# Patient Record
Sex: FEMALE | Race: WHITE | NOT HISPANIC OR LATINO | Employment: FULL TIME | URBAN - METROPOLITAN AREA
[De-identification: names, ages, dates, MRNs, and addresses within clinical notes are randomized per-mention and may not be internally consistent; named-entity substitution may affect disease eponyms.]

---

## 2023-07-25 ENCOUNTER — TELEPHONE (OUTPATIENT)
Dept: FAMILY MEDICINE CLINIC | Facility: CLINIC | Age: 33
End: 2023-07-25
Payer: COMMERCIAL

## 2023-07-25 NOTE — TELEPHONE ENCOUNTER
Caller: Yvrose Suarez    Relationship to patient: Self    Best call back number: [phone]     Patient is needing: PATIENT STATES SHE WAS GIVEN A REFERRAL TO DERMATOLOGY BUT THE OFFICE IT WAS SENT TO DID NOT RECEIVE IT YET. SHE STATES THE OFFICE WAS MOVING LOCATIONS AND THEIR ELECTRONIC SERVICES GOT A LITTLE MESSED UP. SHE IS REQUESTING TO HAVE HER REFERRAL RESENT TO THE LOCATION SO SHE CAN COMPLETE THE VISIT SHE SCHEDULED.     PATIENT STATES THE OFFICE IS CALLED [facility] - PHONE - [phone] (PATIENT COULD NOT LOCATE A FAX NUMBER).

## 2023-07-28 NOTE — TELEPHONE ENCOUNTER
Called patient and left voicemail letting her know her referral was sent to [facility].

## 2023-11-13 ENCOUNTER — OFFICE VISIT (OUTPATIENT)
Age: 33
End: 2023-11-13
Payer: COMMERCIAL

## 2023-11-13 ENCOUNTER — LAB (OUTPATIENT)
Age: 33
End: 2023-11-13
Payer: COMMERCIAL

## 2023-11-13 VITALS
WEIGHT: 147 LBS | HEART RATE: 77 BPM | SYSTOLIC BLOOD PRESSURE: 112 MMHG | DIASTOLIC BLOOD PRESSURE: 64 MMHG | TEMPERATURE: 98.2 F | OXYGEN SATURATION: 100 % | BODY MASS INDEX: 26.05 KG/M2 | HEIGHT: 63 IN

## 2023-11-13 DIAGNOSIS — Z00.00 ROUTINE GENERAL MEDICAL EXAMINATION AT A HEALTH CARE FACILITY: Primary | ICD-10-CM

## 2023-11-13 DIAGNOSIS — Z83.3 FAMILY HISTORY OF DIABETES MELLITUS (DM): ICD-10-CM

## 2023-11-13 DIAGNOSIS — Z83.3 FAMILY HISTORY OF DIABETES MELLITUS: ICD-10-CM

## 2023-11-13 DIAGNOSIS — Z00.00 ANNUAL PHYSICAL EXAM: Primary | ICD-10-CM

## 2023-11-13 DIAGNOSIS — Z00.00 ANNUAL PHYSICAL EXAM: ICD-10-CM

## 2023-11-13 PROCEDURE — 85027 COMPLETE CBC AUTOMATED: CPT | Performed by: NURSE PRACTITIONER

## 2023-11-13 PROCEDURE — 80053 COMPREHEN METABOLIC PANEL: CPT | Performed by: NURSE PRACTITIONER

## 2023-11-13 PROCEDURE — 83036 HEMOGLOBIN GLYCOSYLATED A1C: CPT | Performed by: NURSE PRACTITIONER

## 2023-11-13 PROCEDURE — 80061 LIPID PANEL: CPT | Performed by: NURSE PRACTITIONER

## 2023-11-13 RX ORDER — COPPER 313.4 MG/1
INTRAUTERINE DEVICE INTRAUTERINE
COMMUNITY
Start: 2023-08-03

## 2023-11-13 NOTE — PROGRESS NOTES
"Chief Complaint  Annual Exam    Subjective          Yvrose Suarez presents to [facility] for   History of Present Illness  Pt is here today for annual physical. She has no concerns at this time. She stopped breastfeeding recently. Last PAP was in the last couple of months when ParaGard IUD was placed. She delivered a baby in August.     Objective   Vital Signs:   Vitals:    11/13/23 1424   BP: 112/64   Pulse: 77   Temp: 98.2 °F (36.8 °C)   SpO2: 100%   Weight: 66.7 kg (147 lb)   Height: 160 cm (63\")     Body mass index is 26.04 kg/m².    BMI is >= 25 and <30. (Overweight) The following options were offered after discussion;: Recently post-partum.    Physical Exam  Vitals reviewed.   Constitutional:       Appearance: Normal appearance.   HENT:      Right Ear: Tympanic membrane, ear canal and external ear normal.      Left Ear: Tympanic membrane, ear canal and external ear normal.      Nose: Nose normal.      Mouth/Throat:      Mouth: Mucous membranes are moist.      Pharynx: Oropharynx is clear.   Eyes:      Extraocular Movements: Extraocular movements intact.      Conjunctiva/sclera: Conjunctivae normal.      Pupils: Pupils are equal, round, and reactive to light.   Cardiovascular:      Rate and Rhythm: Normal rate and regular rhythm.      Heart sounds: Normal heart sounds.   Pulmonary:      Effort: Pulmonary effort is normal.      Breath sounds: Normal breath sounds.   Abdominal:      General: Bowel sounds are normal.      Palpations: Abdomen is soft.      Tenderness: There is no abdominal tenderness. There is no guarding or rebound.   Musculoskeletal:         General: Normal range of motion.      Cervical back: Normal range of motion and neck supple.   Skin:     General: Skin is warm.   Neurological:      Mental Status: She is alert and oriented to person, place, and time.   Psychiatric:         Mood and Affect: Mood normal.         Behavior: Behavior normal.         Thought Content: " Thought content normal.        Result Review :                Immunization History   Administered Date(s) Administered    31-influenza Vac Quardvalent Preservativ 11/13/2017, 09/24/2018    DTP 04/10/1996    H1N1 All Forms 11/06/2009    HPV Quadrivalent 09/26/2007, 11/27/2007, 04/16/2008    Hep B, Adolescent or Pediatric 02/07/2002, 04/11/2002    Hep B, Unspecified 04/10/1996    MMR 04/10/1996    OPV 04/10/1996    Tdap 05/15/2007, 09/24/2018, 05/11/2023       Health Maintenance   Topic Date Due    BMI FOLLOWUP  Never done    COVID-19 Vaccine (1) Never done    Pneumococcal Vaccine 0-64 (1 - PCV) Never done    HEPATITIS C SCREENING  Never done    ANNUAL PHYSICAL  Never done    PAP SMEAR  Never done    INFLUENZA VACCINE  03/31/2024 (Originally 8/1/2023)    TDAP/TD VACCINES (4 - Td or Tdap) 05/11/2033        Assessment and Plan    Diagnoses and all orders for this visit:    1. Annual physical exam (Primary)  -     CBC (No Diff); Future  -     Comprehensive Metabolic Panel; Future  -     Lipid Panel; Future    2. Family history of diabetes mellitus (DM)  -     Hemoglobin A1c; Future      Counseling/anticipatory guidance: Nutrition, physical activity, healthy weight, dental health, mental health, eye exam, immunizations, screenings    Discussed vaccines; defers flu vaccine at this time.  Discussed women's health exam; up to date.  Advised pt on BMI; recent post-partum approaching normal BMI range.      Follow Up   Return in about 1 year (around 11/13/2024) for Annual.  Patient was given instructions and counseling regarding her condition or for health maintenance advice. Please see specific information pulled into the AVS if appropriate.

## 2023-11-14 LAB
ALBUMIN SERPL-MCNC: 4.4 G/DL (ref 3.5–5.2)
ALBUMIN/GLOB SERPL: 1.6 G/DL
ALP SERPL-CCNC: 96 U/L (ref 39–117)
ALT SERPL W P-5'-P-CCNC: 10 U/L (ref 1–33)
ANION GAP SERPL CALCULATED.3IONS-SCNC: 7.7 MMOL/L (ref 5–15)
AST SERPL-CCNC: 19 U/L (ref 1–32)
BILIRUB SERPL-MCNC: 0.3 MG/DL (ref 0–1.2)
BUN SERPL-MCNC: 8 MG/DL (ref 6–20)
BUN/CREAT SERPL: 9.1 (ref 7–25)
CALCIUM SPEC-SCNC: 9.9 MG/DL (ref 8.6–10.5)
CHLORIDE SERPL-SCNC: 102 MMOL/L (ref 98–107)
CHOLEST SERPL-MCNC: 223 MG/DL (ref 0–200)
CO2 SERPL-SCNC: 27.3 MMOL/L (ref 22–29)
CREAT SERPL-MCNC: 0.88 MG/DL (ref 0.57–1)
DEPRECATED RDW RBC AUTO: 43.1 FL (ref 37–54)
EGFRCR SERPLBLD CKD-EPI 2021: 89.7 ML/MIN/1.73
ERYTHROCYTE [DISTWIDTH] IN BLOOD BY AUTOMATED COUNT: 13 % (ref 12.3–15.4)
GLOBULIN UR ELPH-MCNC: 2.7 GM/DL
GLUCOSE SERPL-MCNC: 70 MG/DL (ref 65–99)
HBA1C MFR BLD: 5.3 % (ref 4.8–5.6)
HCT VFR BLD AUTO: 39 % (ref 34–46.6)
HDLC SERPL-MCNC: 64 MG/DL (ref 40–60)
HGB BLD-MCNC: 12.9 G/DL (ref 12–15.9)
LDLC SERPL CALC-MCNC: 135 MG/DL (ref 0–100)
LDLC/HDLC SERPL: 2.05 {RATIO}
MCH RBC QN AUTO: 29.9 PG (ref 26.6–33)
MCHC RBC AUTO-ENTMCNC: 33.1 G/DL (ref 31.5–35.7)
MCV RBC AUTO: 90.3 FL (ref 79–97)
PLATELET # BLD AUTO: 357 10*3/MM3 (ref 140–450)
PMV BLD AUTO: 10.8 FL (ref 6–12)
POTASSIUM SERPL-SCNC: 4.3 MMOL/L (ref 3.5–5.2)
PROT SERPL-MCNC: 7.1 G/DL (ref 6–8.5)
RBC # BLD AUTO: 4.32 10*6/MM3 (ref 3.77–5.28)
SODIUM SERPL-SCNC: 137 MMOL/L (ref 136–145)
TRIGL SERPL-MCNC: 139 MG/DL (ref 0–150)
VLDLC SERPL-MCNC: 24 MG/DL (ref 5–40)
WBC NRBC COR # BLD: 8.7 10*3/MM3 (ref 3.4–10.8)

## 2024-07-10 ENCOUNTER — TELEMEDICINE (OUTPATIENT)
Dept: FAMILY MEDICINE CLINIC | Facility: TELEHEALTH | Age: 34
End: 2024-07-10
Payer: COMMERCIAL

## 2024-07-10 DIAGNOSIS — K52.9 GASTROENTERITIS: Primary | ICD-10-CM

## 2024-07-10 PROCEDURE — 99213 OFFICE O/P EST LOW 20 MIN: CPT | Performed by: NURSE PRACTITIONER

## 2024-07-10 RX ORDER — ONDANSETRON 4 MG/1
4 TABLET, ORALLY DISINTEGRATING ORAL EVERY 8 HOURS PRN
Qty: 15 TABLET | Refills: 0 | Status: SHIPPED | OUTPATIENT
Start: 2024-07-10 | End: 2024-07-17

## 2024-07-10 NOTE — LETTER
July 10, 2024     Patient: Yvrose Suarez   YOB: 1990   Date of Visit: 7/10/2024       To Whom It May Concern:    It is my medical opinion that Yvrose Suarez may return to work 7/12/2024.            Sincerely,        LEONILA Sims    CC: No Recipients

## 2024-07-10 NOTE — PATIENT INSTRUCTIONS
Viral Gastroenteritis, Adult    Viral gastroenteritis is also known as the stomach flu. This condition may affect your stomach, small intestine, and large intestine. It can cause sudden watery diarrhea, fever, and vomiting. This condition is caused by many different viruses. These viruses can be passed from person to person very easily (are contagious).  Diarrhea and vomiting can make you feel weak and cause you to become dehydrated. You may not be able to keep fluids down. Dehydration can make you tired and thirsty, cause you to have a dry mouth, and decrease how often you urinate. It is important to replace the fluids that you lose from diarrhea and vomiting.  What are the causes?  Gastroenteritis is caused by many viruses, including rotavirus and norovirus. Norovirus is the most common cause in adults. You can get sick after being exposed to the viruses from other people. You can also get sick by:  Eating food, drinking water, or touching a surface contaminated with one of these viruses.  Sharing utensils or other personal items with an infected person.  What increases the risk?  You are more likely to develop this condition if you:  Have a weak body defense system (immune system).  Live with one or more children who are younger than 2 years.  Live in a nursing home.  Travel on cruise ships.  What are the signs or symptoms?  Symptoms of this condition start suddenly 1-3 days after exposure to a virus. Symptoms may last for a few days or for as long as a week. Common symptoms include watery diarrhea and vomiting. Other symptoms include:  Fever.  Headache.  Fatigue.  Pain in the abdomen.  Chills.  Weakness.  Nausea.  Muscle aches.  Loss of appetite.  How is this diagnosed?  This condition is diagnosed with a medical history and physical exam. You may also have a stool test to check for viruses or other infections.  How is this treated?  This condition typically goes away on its own. The focus of treatment is to  prevent dehydration and restore lost fluids (rehydration). This condition may be treated with:  An oral rehydration solution (ORS) to replace important salts and minerals (electrolytes) in your body. Take this if told by your health care provider. This is a drink that is sold at pharmacies and retail stores.  Medicines to help with your symptoms.  Probiotic supplements to reduce symptoms of diarrhea.  Fluids given through an IV, if dehydration is severe.  Older adults and people with other diseases or a weak immune system are at higher risk for dehydration.  Follow these instructions at home:  Eating and drinking    Take an ORS as told by your health care provider.  Drink clear fluids in small amounts as you are able. Clear fluids include:  Water.  Ice chips.  Diluted fruit juice.  Low-calorie sports drinks.  Drink enough fluid to keep your urine pale yellow.  Eat small amounts of healthy foods every 3-4 hours as you are able. This may include whole grains, fruits, vegetables, lean meats, and yogurt.  Avoid fluids that contain a lot of sugar or caffeine, such as energy drinks, sports drinks, and soda.  Avoid spicy or fatty foods.  Avoid alcohol.  General instructions    Wash your hands often, especially after having diarrhea or vomiting. If soap and water are not available, use hand .  Make sure that all people in your household wash their hands well and often.  Take over-the-counter and prescription medicines only as told by your health care provider.  Rest at home while you recover.  Watch your condition for any changes.  Take a warm bath to relieve any burning or pain from frequent diarrhea episodes.  Keep all follow-up visits. This is important.  Contact a health care provider if you:  Cannot keep fluids down.  Have symptoms that get worse.  Have new symptoms.  Feel light-headed or dizzy.  Have muscle cramps.  Get help right away if you:  Have chest pain.  Have trouble breathing or you are breathing  very quickly.  Have a fast heartbeat.  Feel extremely weak or you faint.  Have a severe headache, a stiff neck, or both.  Have a rash.  Have severe pain, cramping, or bloating in your abdomen.  Have skin that feels cold and clammy.  Feel confused.  Have pain when you urinate.  Have signs of dehydration, such as:  Dark urine, very little urine, or no urine.  Cracked lips.  Dry mouth.  Sunken eyes.  Sleepiness.  Weakness.  Have signs of bleeding, such as:  Seeing blood in your vomit.  Having vomit that looks like coffee grounds.  Having bloody or black stools or stools that look like tar.  These symptoms may be an emergency. Get help right away. Call 911.  Do not wait to see if the symptoms will go away.  Do not drive yourself to the hospital.  Summary  Viral gastroenteritis is also known as the stomach flu. It can cause sudden watery diarrhea, fever, and vomiting.  This condition can be passed from person to person very easily (is contagious).  Take an oral rehydration solution (ORS) if told by your health care provider. This is a drink that is sold at pharmacies and retail stores.  Wash your hands often, especially after having diarrhea or vomiting. If soap and water are not available, use hand .  This information is not intended to replace advice given to you by your health care provider. Make sure you discuss any questions you have with your health care provider.  Document Revised: 10/17/2022 Document Reviewed: 10/17/2022  [employer] Patient Education © 2024 [employer]

## 2024-07-10 NOTE — PROGRESS NOTES
Chief Complaint   Patient presents with    Nausea       Video Visit Reason:   Free Text Description: I believe this is viral but my boss is requiring I have a doctors note.  Subjective   Yvrose Suarez is a 34 y.o. female.     History of Present Illness  Nausea, vomiting and diarrhea with chills for 2 days. She has not checked her temperature but feels feverish.   Nausea  This is a new problem. Episode onset: 2 days. Associated symptoms include a change in bowel habit, chills, fatigue, a fever, nausea and vomiting. Pertinent negatives include no abdominal pain. She has tried rest and sleep for the symptoms. The treatment provided no relief.       The following portions of the patient's history were reviewed and updated as appropriate: allergies, current medications, past medical history, and problem list.      Past Medical History:   Diagnosis Date    Allergic After foot surgery in 2008    Codeine allergy-causes vomiting and sever itching on the inside of my throat    Anxiety 2006    Kidney stone 2019- medullary sponge kidneys    Currently have kidney stones too large to pass     Social History     Socioeconomic History    Marital status: Single   Tobacco Use    Smoking status: Every Day     Current packs/day: 0.25     Average packs/day: 0.3 packs/day for 5.0 years (1.3 ttl pk-yrs)     Types: Cigarettes    Smokeless tobacco: Never   Substance and Sexual Activity    Alcohol use: Not Currently     Alcohol/week: 3.0 - 5.0 standard drinks of alcohol     Types: 3 - 5 Glasses of wine per week    Drug use: Never    Sexual activity: Yes     Partners: Male     Birth control/protection: I.U.D.     Comment: I gave birth on June 30th, 2023 not yet taking birth control     medication documentation: reviewed and updated with patient and   Current Outpatient Medications:     ondansetron ODT (ZOFRAN-ODT) 4 MG disintegrating tablet, Place 1 tablet on the tongue Every 8 (Eight) Hours As Needed for Nausea or Vomiting for up to 7 days.,  Disp: 15 tablet, Rfl: 0    Paragard Intrauterine Copper intrauterine device IUD, Take 1 device by intrauterine route., Disp: , Rfl:   Review of Systems   Constitutional:  Positive for activity change, appetite change, chills, fatigue and fever.   Gastrointestinal:  Positive for change in bowel habit, diarrhea, nausea and vomiting. Negative for abdominal distention and abdominal pain.       Objective   Physical Exam  Constitutional:       General: She is not in acute distress.     Appearance: She is not ill-appearing.   HENT:      Nose: Nose normal.   Eyes:      Conjunctiva/sclera: Conjunctivae normal.   Pulmonary:      Effort: Pulmonary effort is normal.   Neurological:      Mental Status: She is alert.   Psychiatric:         Speech: Speech normal.         Assessment & Plan   Diagnoses and all orders for this visit:    1. Gastroenteritis (Primary)  -     ondansetron ODT (ZOFRAN-ODT) 4 MG disintegrating tablet; Place 1 tablet on the tongue Every 8 (Eight) Hours As Needed for Nausea or Vomiting for up to 7 days.  Dispense: 15 tablet; Refill: 0                    Follow Up:  If your symptoms are not resolving by the completion of your treatment or are worsening, see your primary care provider for follow up. If you don't have a primary care provider, you may go to any Urgent Care for re-evaluation. If you develop any life threatening symptoms, go to the nearest Emergency Department immediately or call EMS.               The use of  Video Visit was utilized during this visit, using both [email or web address] and [employer]. The use of a video visit has been reviewed with the patient and verbal informed consent has been obtained. No technical difficulties occurred during the visit.    is located at [address] [place] [address]  Provider is located at [place], [place]

## 2025-01-09 ENCOUNTER — OFFICE VISIT (OUTPATIENT)
Age: 35
End: 2025-01-09
Payer: COMMERCIAL

## 2025-01-09 ENCOUNTER — LAB (OUTPATIENT)
Age: 35
End: 2025-01-09
Payer: COMMERCIAL

## 2025-01-09 VITALS
SYSTOLIC BLOOD PRESSURE: 126 MMHG | DIASTOLIC BLOOD PRESSURE: 74 MMHG | HEIGHT: 64 IN | WEIGHT: 150 LBS | OXYGEN SATURATION: 98 % | HEART RATE: 64 BPM | BODY MASS INDEX: 25.61 KG/M2

## 2025-01-09 DIAGNOSIS — Z00.00 ANNUAL PHYSICAL EXAM: Primary | ICD-10-CM

## 2025-01-09 DIAGNOSIS — L30.9 ECZEMA, UNSPECIFIED TYPE: ICD-10-CM

## 2025-01-09 DIAGNOSIS — Z11.59 ENCOUNTER FOR HEPATITIS C SCREENING TEST FOR LOW RISK PATIENT: ICD-10-CM

## 2025-01-09 DIAGNOSIS — Z00.00 ANNUAL PHYSICAL EXAM: ICD-10-CM

## 2025-01-09 LAB
ALBUMIN SERPL-MCNC: 4.1 G/DL (ref 3.5–5.2)
ALBUMIN/GLOB SERPL: 1.5 G/DL
ALP SERPL-CCNC: 72 U/L (ref 39–117)
ALT SERPL W P-5'-P-CCNC: 10 U/L (ref 1–33)
ANION GAP SERPL CALCULATED.3IONS-SCNC: 9 MMOL/L (ref 5–15)
AST SERPL-CCNC: 17 U/L (ref 1–32)
BILIRUB SERPL-MCNC: 0.5 MG/DL (ref 0–1.2)
BUN SERPL-MCNC: 8 MG/DL (ref 6–20)
BUN/CREAT SERPL: 9.8 (ref 7–25)
CALCIUM SPEC-SCNC: 9.3 MG/DL (ref 8.6–10.5)
CHLORIDE SERPL-SCNC: 105 MMOL/L (ref 98–107)
CHOLEST SERPL-MCNC: 183 MG/DL (ref 0–200)
CO2 SERPL-SCNC: 24 MMOL/L (ref 22–29)
CREAT SERPL-MCNC: 0.82 MG/DL (ref 0.57–1)
DEPRECATED RDW RBC AUTO: 43.8 FL (ref 37–54)
EGFRCR SERPLBLD CKD-EPI 2021: 96.4 ML/MIN/1.73
ERYTHROCYTE [DISTWIDTH] IN BLOOD BY AUTOMATED COUNT: 12.1 % (ref 12.3–15.4)
GLOBULIN UR ELPH-MCNC: 2.8 GM/DL
GLUCOSE SERPL-MCNC: 84 MG/DL (ref 65–99)
HCT VFR BLD AUTO: 43 % (ref 34–46.6)
HCV AB SER QL: NORMAL
HDLC SERPL-MCNC: 52 MG/DL (ref 40–60)
HGB BLD-MCNC: 13.7 G/DL (ref 12–15.9)
LDLC SERPL CALC-MCNC: 101 MG/DL (ref 0–100)
LDLC/HDLC SERPL: 1.86 {RATIO}
MCH RBC QN AUTO: 31.5 PG (ref 26.6–33)
MCHC RBC AUTO-ENTMCNC: 31.9 G/DL (ref 31.5–35.7)
MCV RBC AUTO: 98.9 FL (ref 79–97)
PLATELET # BLD AUTO: 350 10*3/MM3 (ref 140–450)
PMV BLD AUTO: 11.2 FL (ref 6–12)
POTASSIUM SERPL-SCNC: 4.2 MMOL/L (ref 3.5–5.2)
PROT SERPL-MCNC: 6.9 G/DL (ref 6–8.5)
RBC # BLD AUTO: 4.35 10*6/MM3 (ref 3.77–5.28)
SODIUM SERPL-SCNC: 138 MMOL/L (ref 136–145)
TRIGL SERPL-MCNC: 171 MG/DL (ref 0–150)
VLDLC SERPL-MCNC: 30 MG/DL (ref 5–40)
WBC NRBC COR # BLD AUTO: 9.61 10*3/MM3 (ref 3.4–10.8)

## 2025-01-09 PROCEDURE — 80061 LIPID PANEL: CPT | Performed by: NURSE PRACTITIONER

## 2025-01-09 PROCEDURE — 86803 HEPATITIS C AB TEST: CPT | Performed by: NURSE PRACTITIONER

## 2025-01-09 PROCEDURE — 80053 COMPREHEN METABOLIC PANEL: CPT | Performed by: NURSE PRACTITIONER

## 2025-01-09 PROCEDURE — 36415 COLL VENOUS BLD VENIPUNCTURE: CPT | Performed by: NURSE PRACTITIONER

## 2025-01-09 PROCEDURE — 85027 COMPLETE CBC AUTOMATED: CPT | Performed by: NURSE PRACTITIONER

## 2025-01-09 RX ORDER — TRIAMCINOLONE ACETONIDE 1 MG/G
1 OINTMENT TOPICAL 2 TIMES DAILY
Qty: 80 G | Refills: 0 | Status: SHIPPED | OUTPATIENT
Start: 2025-01-09

## 2025-01-09 NOTE — PROGRESS NOTES
"Chief Complaint  Annual Exam and dry patches on abdomen     Subjective          Yvrose Suarez presents to [facility] for   History of Present Illness  History of Present Illness  The patient presents for a physical exam.    She has observed a slight elevation in her blood pressure, with readings around 126 systolic, compared to her usual range of 110/70. She reports no significant changes in her lifestyle or diet, although she acknowledges an increase in physical activity due to caring for her 18-month-old child. She has made minor dietary modifications, such as incorporating more salads into her meals. She is uncertain about her immunization status for pneumonia. Her last Pap smear was conducted on 12/19/2024 by Dr. Segura at the [facility], and she is currently awaiting the results. She recalls an abnormal HPV result from a test conducted 1 to 2 years ago. She has a scheduled appointment for a vision check in 2 weeks. She reports no gastrointestinal symptoms such as constipation, diarrhea, or abdominal pain. She also reports no menstrual irregularities.    She suspects the development of psoriasis, characterized by dry patches on her skin. She has a family history of psoriasis, with both parents affected. The dry patches are located on her abdomen and arms, and are mildly itchy, but not to the point of irritation.    She has been experiencing a persistent productive cough since her visit to urgent care in 11/2024. She reports no associated fevers.    SOCIAL HISTORY  She works at [employer].    FAMILY HISTORY  Both of her parents had psoriasis.    IMMUNIZATIONS  She is uncertain about her immunization status for pneumonia.    Objective   Vital Signs:   Vitals:    01/09/25 0918   BP: 126/74   BP Location: Left arm   Patient Position: Sitting   Cuff Size: Adult   Pulse: 64   SpO2: 98%   Weight: 68 kg (150 lb)   Height: 162.6 cm (64\")     Body mass index is 25.75 " kg/m².        The following portions of the patient's history were reviewed and updated as appropriate: allergies, current medications, past family history, past medical history, past social history, past surgical history, and problem list.      Physical Exam  Vitals and nursing note reviewed.   Constitutional:       Appearance: Normal appearance.   HENT:      Right Ear: Tympanic membrane, ear canal and external ear normal.      Left Ear: Tympanic membrane, ear canal and external ear normal.      Nose: Nose normal.      Mouth/Throat:      Mouth: Mucous membranes are moist.      Pharynx: Oropharynx is clear.   Eyes:      Conjunctiva/sclera: Conjunctivae normal.      Pupils: Pupils are equal, round, and reactive to light.   Cardiovascular:      Rate and Rhythm: Normal rate and regular rhythm.      Heart sounds: Normal heart sounds.   Pulmonary:      Effort: Pulmonary effort is normal.      Breath sounds: Normal breath sounds.   Abdominal:      General: Bowel sounds are normal.      Palpations: Abdomen is soft.   Musculoskeletal:         General: Normal range of motion.      Cervical back: Normal range of motion and neck supple.   Skin:     General: Skin is warm.   Neurological:      Mental Status: She is alert and oriented to person, place, and time.   Psychiatric:         Mood and Affect: Mood normal.         Behavior: Behavior normal.         Thought Content: Thought content normal.        Result Review :                Immunization History   Administered Date(s) Administered    31-influenza Vac Quardvalent Preservativ 11/13/2017, 09/24/2018    DTP 04/10/1996    H1N1 All Forms 11/06/2009    HPV Quadrivalent 09/26/2007, 11/27/2007, 04/16/2008    Hep B, Adolescent or Pediatric 02/07/2002, 04/11/2002    Hep B, Unspecified 04/10/1996    MMR 04/10/1996    OPV 04/10/1996    Pneumococcal Conjugate 20-Valent (PCV20) 01/09/2025    Tdap 05/15/2007, 09/24/2018, 05/11/2023       Health Maintenance   Topic Date Due    COVID-19  Vaccine (1 - 2024-25 season) Never done    ANNUAL PHYSICAL  11/13/2024    INFLUENZA VACCINE  03/31/2025 (Originally 7/1/2024)    BMI FOLLOWUP  01/09/2026    PAP SMEAR  12/19/2027    TDAP/TD VACCINES (4 - Td or Tdap) 05/11/2033    HEPATITIS C SCREENING  Completed    Pneumococcal Vaccine 0-64  Completed        Assessment and Plan    Diagnoses and all orders for this visit:    1. Annual physical exam (Primary)  -     CBC (No Diff); Future  -     Comprehensive Metabolic Panel; Future  -     Lipid Panel; Future  -     Pneumococcal Conjugate Vaccine 20-Valent (PCV20)    2. Encounter for hepatitis C screening test for low risk patient  -     Hepatitis C Antibody; Future    3. Eczema, unspecified type  -     triamcinolone (KENALOG) 0.1 % ointment; Apply 1 Application topically to the appropriate area as directed 2 (Two) Times a Day.  Dispense: 80 g; Refill: 0      Assessment & Plan  1. Annual physical examination.  Her blood pressure readings are within the normal range. She has experienced a weight loss of approximately 15 pounds since her last visit to urgent care in 11/2024, which could be attributed to increased physical activity. Her BMI is slightly elevated at 25.75, but it does not pose a significant concern at this time. A comprehensive set of laboratory tests will be conducted today, including CBC, CMP, cholesterol panel, and Hepatitis C screening. She will also receive a pneumococcal vaccine during this visit.    2. Suspected eczema.  The dry patches on her skin could potentially be indicative of early-stage psoriasis, but they appear more consistent with eczema. She is advised to avoid lotions containing high levels of alcohol or perfumes, and instead opt for those designed for sensitive skin or eczema. Recommended products include Sarna, Eucerin, and Aquaphor. A prescription for a steroid cream will be provided to manage the condition. If the steroid cream is needed again, she can send a message for a  refill.    3. Persistent cough.  The cough could be a result of postnasal drainage, allergies, or weather changes. She is advised to start a daily allergy medication to alleviate the cough. If the cough persists despite the allergy medication, further investigation will be warranted.    Follow-up  The patient will follow up in 1 year, or sooner if any issues arise.    Counseling/anticipatory guidance: Nutrition, physical activity, healthy weight, dental health, mental health, eye exam, immunizations, screenings   - Reviewed immunization history. Discussed routine labs. Pt up to date on pap. Discussed routine dental and vision screenings.       Follow Up   Return in about 1 year (around 1/9/2026) for Annual.  Patient was given instructions and counseling regarding her condition or for health maintenance advice. Please see specific information pulled into the AVS if appropriate.     Patient or patient representative verbalized consent for the use of Ambient Listening during the visit with  LEONILA Boswell for chart documentation. 1/10/2025  09:23 EST

## 2025-01-09 NOTE — LETTER
[facility]  Vaccine Consent Form    Patient Name:  Yvrose Suarez  Patient :  1990     Vaccine(s) Ordered    Pneumococcal Conjugate Vaccine 20-Valent (PCV20)        Screening Checklist  The following questions should be completed prior to vaccination. If you answer “yes” to any question, it does not necessarily mean you should not be vaccinated. It just means we may need to clarify or ask more questions. If a question is unclear, please ask your healthcare provider to explain it.    Yes No   Any fever or moderate to severe illness today (mild illness and/or antibiotic treatment are not contraindications)?     Do you have a history of a serious reaction to any previous vaccinations, such as anaphylaxis, encephalopathy within 7 days, Guillain-Gobler syndrome within 6 weeks, seizure?     Have you received any live vaccine(s) (e.g MMR, EFRAIN) or any other vaccines in the last month (to ensure duplicate doses aren't given)?     Do you have an anaphylactic allergy to latex (DTaP, DTaP-IPV, Hep A, Hep B, MenB, RV, Td, Tdap), baker’s yeast (Hep B, HPV), polysorbates (RSV, nirsevimab, PCV 20, Rotavirrus, Tdap, Shingrix), or gelatin (EFRAIN, MMR)?     Do you have an anaphylactic allergy to neomycin (Rabies, EFRAIN, MMR, IPV, Hep A), polymyxin B (IPV), or streptomycin (IPV)?      Any cancer, leukemia, AIDS, or other immune system disorder? (EFRAIN, MMR, RV)     Do you have a parent, brother, or sister with an immune system problem (if immune competence of vaccine recipient clinically verified, can proceed)? (MMR, EFRAIN)     Any recent steroid treatments for >2 weeks, chemotherapy, or radiation treatment? (EFRAIN, MMR)     Have you received antibody-containing blood transfusions or IVIG in the past 11 months (recommended interval is dependent on product)? (MMR, EFRAIN)     Have you taken antiviral drugs (acyclovir, famciclovir, valacyclovir for EFRAIN) in the last 24 or 48 hours, respectively?      Are you pregnant or planning to become  "pregnant within 1 month? (EFRAIN, MMR, HPV, IPV, MenB, Abrexvy; For Hep B- refer to Engerix-B; For RSV - Abrysvo is indicated for 32-36 weeks of pregnancy from September to January)     For infants, have you ever been told your child has had intussusception or a medical emergency involving obstruction of the intestine (Rotavirus)? If not for an infant, can skip this question.         *Ordering Physicians/APC should be consulted if \"yes\" is checked by the patient or guardian above.  I have received, read, and understand the Vaccine Information Statement (VIS) for each vaccine ordered.  I have considered my or my child's health status as well as the health status of my close contacts.  I have taken the opportunity to discuss my vaccine questions with my or my child's health care provider.   I have requested that the ordered vaccine(s) be given to me or my child.  I understand the benefits and risks of the vaccines.  I understand that I should remain in the clinic for 15 minutes after receiving the vaccine(s).  _________________________________________________________  Signature of Patient or Parent/Legal Guardian ____________________  Date     "

## 2025-05-20 ENCOUNTER — E-VISIT (OUTPATIENT)
Dept: FAMILY MEDICINE CLINIC | Facility: TELEHEALTH | Age: 35
End: 2025-05-20
Payer: COMMERCIAL

## 2025-05-20 NOTE — E-VISIT TREATED
Date: 2025 07:25:18  Clinician: Liberty Lara  Clinician NPI: [id number]  Patient: Yvrose Suarez  Patient : 1990  Patient Address: [address] [place] [address]  Patient Phone: [phone]  Visit Protocol: URI  Patient Summary:  Yvrose is a 35 year old ( : 1990 ) female who initiated a visit for cold, sinus infection, or influenza.     Yvrose states the symptoms started gradually 1 month or more ago. After the symptoms started, they improved and then   got worse again.   Symptom start date: Unknown   The symptoms consist of myalgia, enlarged lymph nodes, a sore throat, a headache, wheezing, a cough, chills, diarrhea, nasal congestion, nausea, malaise, rhinitis, and vomiting. Yvrose is experiencing   mild difficulty breathing with daily activities but can speak normally in full sentences.   Symptom details     Nasal secretions: The color of the mucus is clear and white.    Cough: Yvrose coughs a few times an hour and the cough is not more bothersome at night. Phlegm comes into the throat when coughing. Yvrose does not believe the cough is caused by post-nasal drip. The color of the phlegm is white and yellow.     Sore throat: Yvrose reports having mild throat pain (1-3 on a 10 point pain scale), does not have exudate on the tonsils, and can swallow liquids without any difficulty. The lymph nodes in the neck are enlarged. The lymph node swelling is not worse on   one side and the swelling has not caused changes in speech or hoarseness in the voice. A rash has not appeared on the skin since the sore throat started. Patient reports feeling pain in the front of the neck that sometimes moves to the ear.     Wheezing: Wheezing impacts daily activities sometimes.     Headache: Yvrose has not had the headache for 12 weeks or more. The headache is mild (1-3 on a 10 point pain scale).      Yvrose denies having teeth pain, fever, anosmia and ageusia, facial pain or pressure, and  ear pain. Yvrose also denies having recent facial or sinus surgery in the past 60 days, taking antibiotic medication in the past month, experiencing difficulty   opening their mouth due to pain or swelling in the jaw muscles, and having a sinus infection within the past year.   Precipitating events  Within the past week, Yvrose has not been exposed to someone with strep throat. Yvrose has not recently been   exposed to someone with influenza. Yvrose has been in close contact with the following high risk individuals: adults 65 or older and people with asthma, heart disease or diabetes.    Yvrose has not received the influenza vaccination.   Pertinent   COVID-19 (Coronavirus) information  Since the symptoms started, Yvrose has not tested for COVID-19. Yvrose was able to test during the visit. The result is negative.   Yvrose has not received a COVID-19 vaccine in the past year.   Pertinent medical   history     A provider has not told Yvrose to avoid NSAIDs.   Yvrose does not get yeast infections when an antibiotic is taken.   Yvrose does not have diabetes. Yvrose denies having immunosuppressive conditions (e.g., chemotherapy, HIV, organ   transplant, long-term use of steroids or other immunosuppressive medications, splenectomy). Yvrose does not have asthma.   Yvrose denies having chronic lung disease, cystic fibrosis, hypertension, long-term disabilities, mental health conditions,   sickle cell disease or thalassemia, stroke or other cardiovascular disease, substance use disorders, or tuberculosis (TB).  Yvrose smokes or uses smokeless tobacco. Yvrose does not vape or use other e-cigarette products.   Yvrose denies pregnancy and   denies breastfeeding.   Additional information as reported by the patient (free text): I've had a cough for over a month with sinus/cold symptoms but the past three days I have been extremely nauseous and today I can barely talk without dry heaving.     Weight: 165 lbs  (74.84 kg)    MEDICATIONS: triamcinolone acetonide topical, ParaGard T 380A intrauterine, ALLERGIES: codeine  Clinician Response:  Dear Yvrose,  Based on the information provided, you have acute bacterial sinusitis, also known as a sinus infection. Most cases of sinus infections are caused by viruses and symptoms start to improve on their own in 7-10 days. Both   viral and bacterial sinus infections usually resolve on its own. A bacterial infection may have developed if any of the following apply to you.      Symptoms of sinus infection lasting 10 days or more without showing any improvement    If you feel better after a viral upper respiratory infection such as, a cold but then suddenly feel worse with symptoms such as fever, headache, or increase in nasal discharge     Medication information  I am prescribing:       Mometasone (Nasonex) 50 mcg/actuation nasal spray. Spray 2 times in each nostril 1 time per day. There are no refills with this prescription.      Amoxicillin-pot clavulanate 875-125 mg oral tablet. Take 1 tablet by mouth every 12 hours for 7 days. There are no refills with this prescription.      Benzonatate 200 mg oral capsule. Take 1 capsule 3 times a day as needed for cough. There are no refills with this prescription.      Albuterol sulfate (Ventolin HFA) 90 mcg/actuation aerosol inhaler. Inhale 2 puffs every 4-6 hours as needed for 5 days. There are no refills with this prescription.     Yeast infections can be a common side effect of antibiotics. The most common symptom of a yeast infection is itchiness in and around the vagina. Other signs and symptoms include burning, redness of the vulva (the outer part of the female genitals), or   a thick, white vaginal discharge that looks like cottage cheese and does not have a bad smell.  If you become pregnant during this course of treatment, stop taking the medication and contact your primary care provider.  Tips for using a nasal spray:     When the  medication is being sprayed in your nose, point the tip of the nasal spray towards your ear.    Do not blow your nose after using the spray.    Do not lean your head back after using the spray as it will go down your throat.    Wipe the tip of the nose piece after use with a dry, clean tissue.     Self care  Steps you can take to be as comfortable as possible:     Rest.    Drink plenty of fluids.    Take a warm shower to loosen congestion.    Use a cool-mist humidifier.    Use throat lozenges.    Suck on frozen items such as popsicles.    Drink hot tea with lemon and honey.    Gargle with warm salt water (1/4 teaspoon of salt per 8 ounce glass of water).    Take a spoonful of honey to reduce your cough.     Becoming tobacco-free is one of the best things you can do to improve your health. Smoking has been found to increase the risk of upper respiratory infections and can also lead to more severe symptoms. For help with quitting, you can call   7-232-QUIT-NOW (1-578.812.3569) or visit smokefree.gov.  When to seek care  Please be seen in a clinic or urgent care if any of the following occur:     New symptoms develop, or symptoms become worse    Symptoms do not start to improve after 3 days of treatment     Call 911 or go to the emergency room if any of the following occur:     Difficulty breathing    If you feel that your throat is closing off    Suddenly develop a rash    Unable to swallow fluids or are drooling     It is possible to have an allergic reaction to an antibiotic even if you have not had one in the past. If you notice a new rash, significant swelling, or difficulty breathing, stop taking this medication immediately and go to a clinic or urgent care.    For the latest updates on COVID-19 (Coronavirus), please visit the Centers for Disease Control and Prevention (CDC). Also, your state and local health department websites may provide additional guidance regarding testing and isolation recommendations for    your location.   Diagnosis: Acute bacterial sinusitis  Diagnosis ICD: J01.90    Follow up instructions:  ATTENTION: If you have been prescribed medications, your prescriptions will not be sent until you choose your pharmacy. To do so open the link within your notification, or go to [facility] and click eVisit in the menu to open your   treatment plan. From there, you can select your pharmacy at the bottom of your after visit summary. You can also go to [email or web address]   Prescriptions  Prescription: methylprednisolone (Medrol (Marck)) 4 mg oral tablets,dose pack, take tablets,dose pack by mouth  Sent To: [facility] #[id number] - [phone] - [address]  [place] [id number]  Prescription: benzonatate 200 mg oral capsule, take 1 capsule 3 times a day as needed for cough  [facility][id number] - [phone] - [address] [place] [address]  Prescription: mometasone (Nasonex) 50 mcg/actuation nasal spray,non-aerosol, spray 2 times in each nostril 1 time per day  [id number] - [phone] - [address]  [place] [id number]  Prescription: albuterol sulfate (Ventolin HFA) 90 mcg/actuation inhalation HFA aerosol inhaler, inhale 2 puffs every 4-6 hours as needed for 5 days  Sent To: [facility] #[id number] - [phone] - [address]  [place] [id number]  Prescription: amoxicillin-pot clavulanate 875-125 mg oral tablet, take 1 tablet by mouth every 12 hours for 7 days  [id number] - [phone] - [address]  [place] [id number]

## 2025-07-12 ENCOUNTER — PATIENT MESSAGE (OUTPATIENT)
Age: 35
End: 2025-07-12
Payer: COMMERCIAL

## 2025-08-14 ENCOUNTER — OFFICE VISIT (OUTPATIENT)
Age: 35
End: 2025-08-14
Payer: COMMERCIAL

## 2025-08-14 VITALS
WEIGHT: 140 LBS | RESPIRATION RATE: 18 BRPM | DIASTOLIC BLOOD PRESSURE: 70 MMHG | OXYGEN SATURATION: 97 % | SYSTOLIC BLOOD PRESSURE: 132 MMHG | BODY MASS INDEX: 23.9 KG/M2 | HEIGHT: 64 IN | HEART RATE: 107 BPM

## 2025-08-14 DIAGNOSIS — F41.1 GAD (GENERALIZED ANXIETY DISORDER): ICD-10-CM

## 2025-08-14 DIAGNOSIS — F33.1 MAJOR DEPRESSIVE DISORDER, RECURRENT, MODERATE: Primary | ICD-10-CM

## 2025-08-14 PROCEDURE — 99214 OFFICE O/P EST MOD 30 MIN: CPT | Performed by: NURSE PRACTITIONER

## 2025-08-14 RX ORDER — ESCITALOPRAM OXALATE 5 MG/1
5 TABLET ORAL DAILY
Qty: 30 TABLET | Refills: 0 | Status: SHIPPED | OUTPATIENT
Start: 2025-08-14